# Patient Record
Sex: FEMALE | Race: ASIAN | NOT HISPANIC OR LATINO | Employment: STUDENT | URBAN - METROPOLITAN AREA
[De-identification: names, ages, dates, MRNs, and addresses within clinical notes are randomized per-mention and may not be internally consistent; named-entity substitution may affect disease eponyms.]

---

## 2021-07-31 ENCOUNTER — HOSPITAL ENCOUNTER (EMERGENCY)
Facility: CLINIC | Age: 36
Discharge: HOME | End: 2021-07-31
Attending: EMERGENCY MEDICINE | Admitting: EMERGENCY MEDICINE
Payer: COMMERCIAL

## 2021-07-31 VITALS
DIASTOLIC BLOOD PRESSURE: 70 MMHG | HEART RATE: 80 BPM | BODY MASS INDEX: 29.44 KG/M2 | SYSTOLIC BLOOD PRESSURE: 126 MMHG | RESPIRATION RATE: 16 BRPM | OXYGEN SATURATION: 100 % | HEIGHT: 62 IN | WEIGHT: 160 LBS | TEMPERATURE: 97.7 F

## 2021-07-31 DIAGNOSIS — S61.411A LACERATION OF RIGHT HAND WITHOUT FOREIGN BODY, INITIAL ENCOUNTER: ICD-10-CM

## 2021-07-31 PROCEDURE — 99282 EMERGENCY DEPT VISIT SF MDM: CPT | Mod: 25 | Performed by: EMERGENCY MEDICINE

## 2021-07-31 PROCEDURE — 99283 EMERGENCY DEPT VISIT LOW MDM: CPT | Performed by: EMERGENCY MEDICINE

## 2021-07-31 PROCEDURE — 12001 RPR S/N/AX/GEN/TRNK 2.5CM/<: CPT | Performed by: EMERGENCY MEDICINE

## 2021-07-31 RX ORDER — LIDOCAINE HYDROCHLORIDE AND EPINEPHRINE 10; 10 MG/ML; UG/ML
10 INJECTION, SOLUTION INFILTRATION; PERINEURAL ONCE
Status: DISCONTINUED | OUTPATIENT
Start: 2021-07-31 | End: 2021-07-31 | Stop reason: HOSPADM

## 2021-07-31 ASSESSMENT — ENCOUNTER SYMPTOMS
WOUND: 1
NUMBNESS: 0
WEAKNESS: 0

## 2021-07-31 ASSESSMENT — MIFFLIN-ST. JEOR: SCORE: 1364.01

## 2021-07-31 NOTE — DISCHARGE INSTRUCTIONS
Keep wound clean and dry.  Apply a thin layer of bacitracin antibiotic ointment and change your dressing twice daily and as needed.  Follow-up with your primary care clinic in approximately 10 days for suture removal.    Return to the emergency department if redness, swelling, drainage, fever, or other concerns.

## 2021-07-31 NOTE — ED NOTES
MD sutured patient at bedside. Patient tolerated well. Care instructions given at discharge.  
Declines

## 2021-07-31 NOTE — ED PROVIDER NOTES
"ED Provider Note  [facility]      History     Chief Complaint   Patient presents with     Laceration     HPI  Edwin Chambers is a 35 year old right-hand-dominant female presenting with a hand laceration.  The patient states that she was trying to close a glass door on her cabinet when she lacerated her right hand on the edge of the glass.  She states the edge of the glass felt jagged afterwards but she just recently moved in to the apartment so is unsure if it was previously jagged.  She denies any foreign body sensation.  Her last tetanus immunization was 1 month ago.  She denies any distal weakness or numbness.    Past Medical History  History reviewed. No pertinent past medical history.  No past surgical history on file.  No current outpatient medications on file.    No Known Allergies  Family History  No family history on file.  Social History   Social History     Tobacco Use     Smoking status: None   Substance Use Topics     Alcohol use: None     Drug use: None      Past medical history, past surgical history, medications, allergies, family history, and social history were reviewed with the patient. No additional pertinent items.       Review of Systems   Skin: Positive for wound.   Neurological: Negative for weakness and numbness.   All other systems reviewed and are negative.    A complete review of systems was performed with pertinent positives and negatives noted in the HPI, and all other systems negative.    Physical Exam   BP: 126/70  Pulse: 82  Temp: 97.7  F (36.5  C)  Resp: 16  Height: 157.5 cm (5' 2\")  Weight: 72.6 kg (160 lb)  SpO2: 100 %  Physical Exam  Vitals and nursing note reviewed.   Constitutional:       Appearance: Normal appearance.   HENT:      Head: Normocephalic and atraumatic.   Cardiovascular:      Rate and Rhythm: Normal rate.      Pulses: Normal pulses.   Musculoskeletal:      Left hand: Laceration present. Normal strength. Normal sensation. Normal " capillary refill. Normal pulse.        Hands:    Skin:     General: Skin is warm and dry.   Neurological:      Mental Status: She is alert.      Sensory: No sensory deficit.      Motor: No weakness.         ED Course      Procedures       [facility] Procedure Note        Laceration Repair:    Performed by: NOVA WYNNE MD, MD  Authorized by: NOVA WYNNE MD, MD  Consent given by: Patient who states understanding of the procedure being performed after discussing the risks, benefits and alternatives.    Preparation: Patient was prepped and draped in usual sterile fashion.  Irrigation solution: saline    Body area:right hand  Laceration length: 2cm  Contamination: The wound is not contaminated.  Foreign bodies:none  Tendon involvement: none  Anesthesia: Local  Local anesthetic: Lidocaine     1%, with epinephrine  Anesthetic total: 3ml    Debridement: none  Skin closure: Closed with 5 x 5.0 Ethilon  Technique: interrupted  Approximation: close  Approximation difficulty: simple    Patient tolerance: Patient tolerated the procedure well with no immediate complications.        No results found for any visits on 07/31/21.  Medications - No data to display     Assessments & Plan (with Medical Decision Making)   35 year old right-hand-dominant female to the emergency department with right hand laceration as she sustained on a glass door.  The glass did not break and she does not have any clinical evidence for foreign body.  Distal CMS is intact.  The wound was repaired in the usual fashion with good hemostasis and anatomic realignment.  Patient be discharged home with plan for suture removal in approximately 10 days.  Return precautions were provided.    I have reviewed the nursing notes. I have reviewed the findings, diagnosis, plan and need for follow up with the patient.    There are no discharge medications for this patient.      Final diagnoses:   Laceration of right hand without foreign body, initial encounter        --  Chart documentation was completed with Dragon voice-recognition software. Even though reviewed, this chart may still contain some grammatical, spelling, and word errors.     [facility] EMERGENCY DEPARTMENT  7/31/2021     Rony King MD  07/31/21 2026